# Patient Record
Sex: FEMALE | Race: WHITE | NOT HISPANIC OR LATINO
[De-identification: names, ages, dates, MRNs, and addresses within clinical notes are randomized per-mention and may not be internally consistent; named-entity substitution may affect disease eponyms.]

---

## 2018-10-10 ENCOUNTER — APPOINTMENT (OUTPATIENT)
Dept: OBGYN | Facility: CLINIC | Age: 38
End: 2018-10-10
Payer: MEDICAID

## 2018-10-10 ENCOUNTER — OUTPATIENT (OUTPATIENT)
Dept: OUTPATIENT SERVICES | Facility: HOSPITAL | Age: 38
LOS: 1 days | End: 2018-10-10
Payer: MEDICAID

## 2018-10-10 ENCOUNTER — LABORATORY RESULT (OUTPATIENT)
Age: 38
End: 2018-10-10

## 2018-10-10 VITALS
HEIGHT: 65 IN | SYSTOLIC BLOOD PRESSURE: 110 MMHG | DIASTOLIC BLOOD PRESSURE: 70 MMHG | BODY MASS INDEX: 27.66 KG/M2 | WEIGHT: 166 LBS

## 2018-10-10 DIAGNOSIS — N76.0 ACUTE VAGINITIS: ICD-10-CM

## 2018-10-10 DIAGNOSIS — Z72.0 TOBACCO USE: ICD-10-CM

## 2018-10-10 DIAGNOSIS — Z87.410 PERSONAL HISTORY OF CERVICAL DYSPLASIA: ICD-10-CM

## 2018-10-10 DIAGNOSIS — Z01.419 ENCOUNTER FOR GYNECOLOGICAL EXAMINATION (GENERAL) (ROUTINE) WITHOUT ABNORMAL FINDINGS: ICD-10-CM

## 2018-10-10 DIAGNOSIS — Z80.42 FAMILY HISTORY OF MALIGNANT NEOPLASM OF PROSTATE: ICD-10-CM

## 2018-10-10 DIAGNOSIS — Z80.3 FAMILY HISTORY OF MALIGNANT NEOPLASM OF BREAST: ICD-10-CM

## 2018-10-10 PROCEDURE — 87591 N.GONORRHOEAE DNA AMP PROB: CPT

## 2018-10-10 PROCEDURE — 99203 OFFICE O/P NEW LOW 30 MIN: CPT | Mod: NC

## 2018-10-10 PROCEDURE — 87491 CHLMYD TRACH DNA AMP PROBE: CPT

## 2018-10-10 PROCEDURE — G0463: CPT

## 2018-10-11 LAB
C TRACH RRNA SPEC QL NAA+PROBE: SIGNIFICANT CHANGE UP
N GONORRHOEA RRNA SPEC QL NAA+PROBE: SIGNIFICANT CHANGE UP
SPECIMEN SOURCE: SIGNIFICANT CHANGE UP

## 2018-10-22 DIAGNOSIS — Z80.42 FAMILY HISTORY OF MALIGNANT NEOPLASM OF PROSTATE: ICD-10-CM

## 2018-10-22 DIAGNOSIS — Z87.410 PERSONAL HISTORY OF CERVICAL DYSPLASIA: ICD-10-CM

## 2018-10-22 DIAGNOSIS — Z80.3 FAMILY HISTORY OF MALIGNANT NEOPLASM OF BREAST: ICD-10-CM

## 2018-11-08 ENCOUNTER — OUTPATIENT (OUTPATIENT)
Dept: OUTPATIENT SERVICES | Facility: HOSPITAL | Age: 38
LOS: 1 days | End: 2018-11-08
Payer: MEDICAID

## 2018-11-08 VITALS
HEART RATE: 78 BPM | RESPIRATION RATE: 18 BRPM | TEMPERATURE: 98 F | OXYGEN SATURATION: 99 % | DIASTOLIC BLOOD PRESSURE: 70 MMHG | WEIGHT: 164.02 LBS | HEIGHT: 65 IN | SYSTOLIC BLOOD PRESSURE: 130 MMHG

## 2018-11-08 DIAGNOSIS — Z98.818 OTHER DENTAL PROCEDURE STATUS: Chronic | ICD-10-CM

## 2018-11-08 DIAGNOSIS — Z01.818 ENCOUNTER FOR OTHER PREPROCEDURAL EXAMINATION: ICD-10-CM

## 2018-11-08 DIAGNOSIS — Z30.2 ENCOUNTER FOR STERILIZATION: ICD-10-CM

## 2018-11-08 PROCEDURE — G0463: CPT

## 2018-11-08 PROCEDURE — 85027 COMPLETE CBC AUTOMATED: CPT

## 2018-11-08 RX ORDER — SODIUM CHLORIDE 9 MG/ML
3 INJECTION INTRAMUSCULAR; INTRAVENOUS; SUBCUTANEOUS EVERY 8 HOURS
Qty: 0 | Refills: 0 | Status: DISCONTINUED | OUTPATIENT
Start: 2018-11-20 | End: 2018-12-05

## 2018-11-08 NOTE — H&P PST ADULT - ATTENDING COMMENTS
Agree with above  Pt wishes to have permanent sterilization.    Pt for laparoscopic bilateral tubal sterilization.    Pt understands risks of surgery including but not limited to bleeding, infection, injury to bowel/bladder, nerve damage, blood tx and even maternal death.  Pt has verbalized understanding of the above and has signed informed consent.

## 2018-11-09 LAB
HCT VFR BLD CALC: 41.2 % — SIGNIFICANT CHANGE UP (ref 34.5–45)
HGB BLD-MCNC: 13.6 G/DL — SIGNIFICANT CHANGE UP (ref 11.5–15.5)
MCHC RBC-ENTMCNC: 30.8 PG — SIGNIFICANT CHANGE UP (ref 27–34)
MCHC RBC-ENTMCNC: 33 GM/DL — SIGNIFICANT CHANGE UP (ref 32–36)
MCV RBC AUTO: 93.4 FL — SIGNIFICANT CHANGE UP (ref 80–100)
PLATELET # BLD AUTO: 206 K/UL — SIGNIFICANT CHANGE UP (ref 150–400)
RBC # BLD: 4.41 M/UL — SIGNIFICANT CHANGE UP (ref 3.8–5.2)
RBC # FLD: 13.4 % — SIGNIFICANT CHANGE UP (ref 10.3–14.5)
WBC # BLD: 9.79 K/UL — SIGNIFICANT CHANGE UP (ref 3.8–10.5)
WBC # FLD AUTO: 9.79 K/UL — SIGNIFICANT CHANGE UP (ref 3.8–10.5)

## 2018-11-19 ENCOUNTER — TRANSCRIPTION ENCOUNTER (OUTPATIENT)
Age: 38
End: 2018-11-19

## 2018-11-20 ENCOUNTER — OUTPATIENT (OUTPATIENT)
Dept: OUTPATIENT SERVICES | Facility: HOSPITAL | Age: 38
LOS: 1 days | End: 2018-11-20
Payer: MEDICAID

## 2018-11-20 ENCOUNTER — APPOINTMENT (OUTPATIENT)
Dept: OBGYN | Facility: HOSPITAL | Age: 38
End: 2018-11-20

## 2018-11-20 ENCOUNTER — RESULT REVIEW (OUTPATIENT)
Age: 38
End: 2018-11-20

## 2018-11-20 VITALS
SYSTOLIC BLOOD PRESSURE: 109 MMHG | OXYGEN SATURATION: 100 % | TEMPERATURE: 98 F | HEART RATE: 80 BPM | RESPIRATION RATE: 20 BRPM | DIASTOLIC BLOOD PRESSURE: 69 MMHG

## 2018-11-20 VITALS
RESPIRATION RATE: 16 BRPM | DIASTOLIC BLOOD PRESSURE: 76 MMHG | OXYGEN SATURATION: 100 % | TEMPERATURE: 98 F | HEIGHT: 65 IN | SYSTOLIC BLOOD PRESSURE: 120 MMHG | HEART RATE: 84 BPM | WEIGHT: 164.02 LBS

## 2018-11-20 DIAGNOSIS — Z30.2 ENCOUNTER FOR STERILIZATION: ICD-10-CM

## 2018-11-20 DIAGNOSIS — Z01.818 ENCOUNTER FOR OTHER PREPROCEDURAL EXAMINATION: ICD-10-CM

## 2018-11-20 DIAGNOSIS — Z98.818 OTHER DENTAL PROCEDURE STATUS: Chronic | ICD-10-CM

## 2018-11-20 PROCEDURE — 58661 LAPAROSCOPY REMOVE ADNEXA: CPT | Mod: 50

## 2018-11-20 PROCEDURE — 88302 TISSUE EXAM BY PATHOLOGIST: CPT | Mod: 26

## 2018-11-20 PROCEDURE — 88302 TISSUE EXAM BY PATHOLOGIST: CPT

## 2018-11-20 PROCEDURE — 58661 LAPAROSCOPY REMOVE ADNEXA: CPT

## 2018-11-20 RX ORDER — FAMOTIDINE 10 MG/ML
20 INJECTION INTRAVENOUS ONCE
Qty: 0 | Refills: 0 | Status: COMPLETED | OUTPATIENT
Start: 2018-11-20 | End: 2018-11-20

## 2018-11-20 RX ORDER — ACETAMINOPHEN 500 MG
1000 TABLET ORAL ONCE
Qty: 0 | Refills: 0 | Status: COMPLETED | OUTPATIENT
Start: 2018-11-20 | End: 2018-11-20

## 2018-11-20 RX ORDER — SODIUM CHLORIDE 9 MG/ML
1000 INJECTION, SOLUTION INTRAVENOUS
Qty: 0 | Refills: 0 | Status: DISCONTINUED | OUTPATIENT
Start: 2018-11-20 | End: 2018-12-05

## 2018-11-20 RX ORDER — ACETAMINOPHEN 500 MG
650 TABLET ORAL
Qty: 0 | Refills: 0 | COMMUNITY

## 2018-11-20 RX ORDER — ONDANSETRON 8 MG/1
4 TABLET, FILM COATED ORAL ONCE
Qty: 0 | Refills: 0 | Status: DISCONTINUED | OUTPATIENT
Start: 2018-11-20 | End: 2018-12-05

## 2018-11-20 RX ORDER — OXYCODONE HYDROCHLORIDE 5 MG/1
5 TABLET ORAL ONCE
Qty: 0 | Refills: 0 | Status: DISCONTINUED | OUTPATIENT
Start: 2018-11-20 | End: 2018-11-20

## 2018-11-20 RX ORDER — OXYCODONE HYDROCHLORIDE 5 MG/1
10 TABLET ORAL ONCE
Qty: 0 | Refills: 0 | Status: DISCONTINUED | OUTPATIENT
Start: 2018-11-20 | End: 2018-11-20

## 2018-11-20 RX ORDER — IBUPROFEN 200 MG
3 TABLET ORAL
Qty: 0 | Refills: 0 | COMMUNITY

## 2018-11-20 RX ORDER — CELECOXIB 200 MG/1
200 CAPSULE ORAL ONCE
Qty: 0 | Refills: 0 | Status: DISCONTINUED | OUTPATIENT
Start: 2018-11-20 | End: 2018-12-05

## 2018-11-20 RX ORDER — CELECOXIB 200 MG/1
200 CAPSULE ORAL ONCE
Qty: 0 | Refills: 0 | Status: COMPLETED | OUTPATIENT
Start: 2018-11-20 | End: 2018-11-20

## 2018-11-20 RX ORDER — OXYCODONE HYDROCHLORIDE 5 MG/1
1 TABLET ORAL
Qty: 10 | Refills: 0 | OUTPATIENT
Start: 2018-11-20

## 2018-11-20 RX ADMIN — CELECOXIB 200 MILLIGRAM(S): 200 CAPSULE ORAL at 12:42

## 2018-11-20 RX ADMIN — Medication 1000 MILLIGRAM(S): at 12:42

## 2018-11-20 RX ADMIN — FAMOTIDINE 20 MILLIGRAM(S): 10 INJECTION INTRAVENOUS at 16:03

## 2018-11-20 NOTE — ASU DISCHARGE PLAN (ADULT/PEDIATRIC). - MEDICATION SUMMARY - MEDICATIONS TO TAKE
I will START or STAY ON the medications listed below when I get home from the hospital:    oxyCODONE 5 mg oral tablet  -- 1 tab(s) by mouth every 6 hours, As Needed -for severe pain MDD:4   -- Caution federal law prohibits the transfer of this drug to any person other  than the person for whom it was prescribed.  It is very important that you take or use this exactly as directed.  Do not skip doses or discontinue unless directed by your doctor.  May cause drowsiness.  Alcohol may intensify this effect.  Use care when operating dangerous machinery.  This prescription cannot be refilled.  Using more of this medication than prescribed may cause serious breathing problems.    -- Indication: For pain    ibuprofen 200 mg oral tablet  -- 3 tab(s) by mouth every 6 hours  -- Indication: For pain    Tylenol  -- 650 milligram(s) by mouth every 6 hours  -- Indication: For pain

## 2018-11-20 NOTE — BRIEF OPERATIVE NOTE - PROCEDURE
<<-----Click on this checkbox to enter Procedure Salpingectomy, bilateral, laparoscopic  11/20/2018    Active  [id number]

## 2018-11-20 NOTE — ASU PREOP CHECKLIST - TO WHOM
YARELI Gallegos from YARELI Smith @1821 YARELI Gallegos from YARELI Smith @[facility] / report received from THOMAS Mello RN

## 2018-11-20 NOTE — PRE-ANESTHESIA EVALUATION ADULT - NSANTHOSAYNRD_GEN_A_CORE
No. ISAURA screening performed.  STOP BANG Legend: 0-2 = LOW Risk; 3-4 = INTERMEDIATE Risk; 5-8 = HIGH Risk

## 2018-11-20 NOTE — ASU DISCHARGE PLAN (ADULT/PEDIATRIC). - FOLLOWUP APPOINTMENT CLINIC/PHYSICIAN
Follow up in 2 weeks with the GYN clinic @ [address] [facility]. Call to make an appointment: [phone]

## 2018-11-20 NOTE — ASU DISCHARGE PLAN (ADULT/PEDIATRIC). - ACTIVITY LEVEL
nothing per rectum/no douching/nothing per vagina/no tub baths/until cleared by MD/no tampons/no intercourse

## 2018-11-29 PROBLEM — F17.200 NICOTINE DEPENDENCE, UNSPECIFIED, UNCOMPLICATED: Chronic | Status: ACTIVE | Noted: 2018-11-08

## 2018-12-10 ENCOUNTER — APPOINTMENT (OUTPATIENT)
Dept: OBGYN | Facility: CLINIC | Age: 38
End: 2018-12-10
Payer: MEDICAID

## 2018-12-10 ENCOUNTER — OUTPATIENT (OUTPATIENT)
Dept: OUTPATIENT SERVICES | Facility: HOSPITAL | Age: 38
LOS: 1 days | End: 2018-12-10
Payer: MEDICAID

## 2018-12-10 VITALS — DIASTOLIC BLOOD PRESSURE: 72 MMHG | WEIGHT: 165 LBS | BODY MASS INDEX: 27.46 KG/M2 | SYSTOLIC BLOOD PRESSURE: 112 MMHG

## 2018-12-10 DIAGNOSIS — N76.0 ACUTE VAGINITIS: ICD-10-CM

## 2018-12-10 DIAGNOSIS — Z98.818 OTHER DENTAL PROCEDURE STATUS: Chronic | ICD-10-CM

## 2018-12-10 PROCEDURE — G0463: CPT

## 2018-12-10 PROCEDURE — 99213 OFFICE O/P EST LOW 20 MIN: CPT | Mod: GE

## 2018-12-12 DIAGNOSIS — Z90.79 ACQUIRED ABSENCE OF OTHER GENITAL ORGAN(S): ICD-10-CM

## 2018-12-22 ENCOUNTER — TRANSCRIPTION ENCOUNTER (OUTPATIENT)
Age: 38
End: 2018-12-22

## 2019-11-09 ENCOUNTER — TRANSCRIPTION ENCOUNTER (OUTPATIENT)
Age: 39
End: 2019-11-09

## 2020-12-04 ENCOUNTER — TRANSCRIPTION ENCOUNTER (OUTPATIENT)
Age: 40
End: 2020-12-04

## 2021-01-09 ENCOUNTER — FORM ENCOUNTER (OUTPATIENT)
Age: 41
End: 2021-01-09

## 2021-01-11 ENCOUNTER — APPOINTMENT (OUTPATIENT)
Dept: INTERNAL MEDICINE | Facility: CLINIC | Age: 41
End: 2021-01-11

## 2021-01-11 DIAGNOSIS — Z78.9 OTHER SPECIFIED HEALTH STATUS: ICD-10-CM

## 2021-01-11 DIAGNOSIS — U07.1 COVID-19: ICD-10-CM

## 2021-01-11 RX ORDER — BENZONATATE 100 MG/1
100 CAPSULE ORAL EVERY 8 HOURS
Qty: 30 | Refills: 0 | Status: ACTIVE | COMMUNITY
Start: 2021-01-11 | End: 1900-01-01

## 2021-01-12 PROBLEM — Z78.9 SOCIAL ALCOHOL USE: Status: ACTIVE | Noted: 2021-01-12

## 2021-01-12 PROBLEM — U07.1 COVID-19: Status: ACTIVE | Noted: 2021-01-11

## 2021-01-13 NOTE — ASSESSMENT
[FreeTextEntry1] : Ms. CANALES is a 40 year F with no significant PMH here to establish care\par \par #HCM\par - Will need to be addressed next visit after 14 days of quarantining\par \par \par RTC in 1-2 months for in person visit for physical\par \par \par Case d/w Dr. Cameron\par \par \par \par Chasidy Basurto MD\par Internal Medicine PGY-3

## 2021-01-13 NOTE — HISTORY OF PRESENT ILLNESS
[Home] : at home, [unfilled] , at the time of the visit. [Other Location: e.g. Home (Enter Location, City,State)___] : at [unfilled] [Verbal consent obtained from patient] : the patient, [unfilled] [FreeTextEntry1] : Ms. CANALES is a 40 year F with no significant PMH here to establish care [de-identified] : Patient reports she wants to be treated for COVID19. She tested positive on 1/6 and since then has been experiencing worsening cough. In addition has loss of smell and loss of taste. The day before she was able to taste her chinese food however the following day she was unable to taste food that’s when she decided to be tested. In addition, she believes he contracted the virus from her  who is currently at [facility] on ECMO, not doing well. Her brother-in-law received the monoclonal antibody infusion and was wondering if she could get it to feel better. Her 16-year old step-son also tested positive. She is now quarantining at home and wearing a mask. Currently she has no shortness of breath, abdominal pain, nausea, vomiting, fevers, chills or diarrhea. She has a pulse oximeter which typically reads 98-99%. For her cough has been trying Mucinex with minimal relief. Had an extensive conversation regarding treatment/management for COVID19 inpatient vs. outpatient and how given how young and healthy she is she does not currently meet criteria for monoclonal antibody infusion. Rather for her the focus will be on symptoms for comfort.

## 2021-01-13 NOTE — REVIEW OF SYSTEMS
[Cough] : cough [Fever] : no fever [Chills] : no chills [Nasal Discharge] : no nasal discharge [Chest Pain] : no chest pain [Lower Ext Edema] : no lower extremity edema [Shortness Of Breath] : no shortness of breath [Wheezing] : no wheezing [Dyspnea on Exertion] : no dyspnea on exertion [Abdominal Pain] : no abdominal pain [Nausea] : no nausea [Diarrhea] : diarrhea [Vomiting] : no vomiting [Dysuria] : no dysuria

## 2021-01-16 ENCOUNTER — NON-APPOINTMENT (OUTPATIENT)
Age: 41
End: 2021-01-16

## 2021-01-19 ENCOUNTER — NON-APPOINTMENT (OUTPATIENT)
Age: 41
End: 2021-01-19

## 2021-07-30 ENCOUNTER — NON-APPOINTMENT (OUTPATIENT)
Age: 41
End: 2021-07-30

## 2021-07-30 LAB
COVID-19 SPIKE DOMAIN ANTIBODY INTERPRETATION: POSITIVE
SARS-COV-2 AB SERPL IA-ACNC: 132 U/ML

## 2021-08-30 ENCOUNTER — APPOINTMENT (OUTPATIENT)
Dept: INTERNAL MEDICINE | Facility: CLINIC | Age: 41
End: 2021-08-30
Payer: MEDICAID

## 2021-08-30 ENCOUNTER — OUTPATIENT (OUTPATIENT)
Dept: OUTPATIENT SERVICES | Facility: HOSPITAL | Age: 41
LOS: 1 days | End: 2021-08-30
Payer: MEDICAID

## 2021-08-30 VITALS
HEART RATE: 88 BPM | DIASTOLIC BLOOD PRESSURE: 80 MMHG | HEIGHT: 65 IN | WEIGHT: 172 LBS | OXYGEN SATURATION: 99 % | BODY MASS INDEX: 28.66 KG/M2 | SYSTOLIC BLOOD PRESSURE: 120 MMHG

## 2021-08-30 DIAGNOSIS — Z98.818 OTHER DENTAL PROCEDURE STATUS: Chronic | ICD-10-CM

## 2021-08-30 DIAGNOSIS — F17.200 NICOTINE DEPENDENCE, UNSPECIFIED, UNCOMPLICATED: ICD-10-CM

## 2021-08-30 DIAGNOSIS — Z00.00 ENCOUNTER FOR GENERAL ADULT MEDICAL EXAMINATION WITHOUT ABNORMAL FINDINGS: ICD-10-CM

## 2021-08-30 DIAGNOSIS — R00.2 PALPITATIONS: ICD-10-CM

## 2021-08-30 DIAGNOSIS — Z23 ENCOUNTER FOR IMMUNIZATION: ICD-10-CM

## 2021-08-30 DIAGNOSIS — I10 ESSENTIAL (PRIMARY) HYPERTENSION: ICD-10-CM

## 2021-08-30 DIAGNOSIS — Z72.89 OTHER PROBLEMS RELATED TO LIFESTYLE: ICD-10-CM

## 2021-08-30 PROCEDURE — 90471 IMMUNIZATION ADMIN: CPT

## 2021-08-30 PROCEDURE — 90715 TDAP VACCINE 7 YRS/> IM: CPT

## 2021-08-30 PROCEDURE — 99396 PREV VISIT EST AGE 40-64: CPT | Mod: GC

## 2021-08-30 PROCEDURE — G0463: CPT | Mod: 25

## 2021-09-18 NOTE — HISTORY OF PRESENT ILLNESS
[FreeTextEntry1] : CPE [de-identified] : Patient is 40yoF w/o major medical issues, s/p COVID w/o hospitalization in January 2021 here for CPE. Recent labs done 8/13, unremarkable. Last PAP in 2017, patient-reported as normal. Currently on menstrual period. Patient's complains of left shoulder/arm pain, shooting/stabbing, from axilla to elbow on left. Also has concerns for occasional palpitations w/o chest pain, dyspnea, diaphoresis, or syncope, sometimes random, sometime stress-associated, always self-resolving. Does not want to pursue COVID vaccine at this time, but does request TDaP. Is current everyday smoker, 6 cigarettes a day, and daily drinker 2-3 glasses of wine nightly. Does not see either as an issue in her life. Denies depressive symptoms, SI.

## 2021-09-18 NOTE — REVIEW OF SYSTEMS
[Palpitations] : palpitations [Anxiety] : anxiety [Fever] : no fever [Chills] : no chills [Discharge] : no discharge [Vision Problems] : no vision problems [Earache] : no earache [Hearing Loss] : no hearing loss [Sore Throat] : no sore throat [Chest Pain] : no chest pain [Lower Ext Edema] : no lower extremity edema [Orthopnea] : no orthopnea [Shortness Of Breath] : no shortness of breath [Cough] : no cough [Dyspnea on Exertion] : no dyspnea on exertion [Abdominal Pain] : no abdominal pain [Nausea] : no nausea [Vomiting] : no vomiting [Dysuria] : no dysuria [Frequency] : no frequency [Joint Pain] : no joint pain [Muscle Pain] : no muscle pain [Back Pain] : no back pain [Itching] : no itching [Headache] : no headache [Dizziness] : no dizziness [Fainting] : no fainting [Suicidal] : not suicidal [Depression] : no depression [Swollen Glands] : no swollen glands [FreeTextEntry9] : + generalized L arm pain

## 2021-09-18 NOTE — HEALTH RISK ASSESSMENT
[] : Yes [10-14] : 10-14 [Yes] : Yes [4 or more  times a week (4 pts)] : 4 or more  times a week (4 points) [3 or 4 (1 pt)] : 3 or 4  (1 point) [Never (0 pts)] : Never (0 points) [0] : 2) Feeling down, depressed, or hopeless: Not at all (0) [PHQ-2 Negative - No further assessment needed] : PHQ-2 Negative - No further assessment needed [Audit-CScore] : 5 [[id number]] : 0

## 2021-09-18 NOTE — PHYSICAL EXAM
[No Acute Distress] : no acute distress [Well Nourished] : well nourished [Well Developed] : well developed [Well-Appearing] : well-appearing [Normal Sclera/Conjunctiva] : normal sclera/conjunctiva [PERRL] : pupils equal round and reactive to light [EOMI] : extraocular movements intact [Normal Outer Ear/Nose] : the outer ears and nose were normal in appearance [Normal Oropharynx] : the oropharynx was normal [No Lymphadenopathy] : no lymphadenopathy [No Respiratory Distress] : no respiratory distress  [No Accessory Muscle Use] : no accessory muscle use [Clear to Auscultation] : lungs were clear to auscultation bilaterally [Normal Rate] : normal rate  [Regular Rhythm] : with a regular rhythm [No Murmur] : no murmur heard [Pedal Pulses Present] : the pedal pulses are present [No Edema] : there was no peripheral edema [Soft] : abdomen soft [Non Tender] : non-tender [Normal Bowel Sounds] : normal bowel sounds [Normal Posterior Cervical Nodes] : no posterior cervical lymphadenopathy [Normal Anterior Cervical Nodes] : no anterior cervical lymphadenopathy [No Spinal Tenderness] : no spinal tenderness [No Joint Swelling] : no joint swelling [Grossly Normal Strength/Tone] : grossly normal strength/tone [No Rash] : no rash [No Focal Deficits] : no focal deficits [Normal Gait] : normal gait [Normal Affect] : the affect was normal [Alert and Oriented x3] : oriented to person, place, and time [Normal Insight/Judgement] : insight and judgment were intact [de-identified] : Pain in left arm elicited w/ abduction of shoulder

## 2021-09-18 NOTE — ASSESSMENT
[FreeTextEntry1] : Patient is a 40yoF current everyday smoker and alcohol use disorder here for CPE, also with concerns for palpitations and L arm pain, likely benign/MSK in nature, respectively.\par \par #L arm pain\par -reproduced on L shoulder abduction; suspect nerve impingement\par -no crepitus noted in shoulder, normal strength and ROM\par -cont to monitor\par \par #Palpitations\par -cardiac exam normal, no aberrancy in pulse noted. In setting of otherwise normal exam and lack of red-flag symptoms, likely benign periods of ectopy, self-resolving, linked to stressors\par -reassurance given; patient educated on red-flags for MI/angina\par -advised to stop smoking/ heavy EtOH use \par \par #Smoker\par -currently 6 cigarettes daily\par -advised quitting for lung and cardiovascular health; patient appears unwilling to quit at this time\par \par #EtOH use disorder\par -current drinking habits suggestive of mild abuse; patient admits to using alcohol as treatment for stress, declines psychiatric meds/interventions at this time, and thinks she'll be able to quit nightly drinking now that she has a new job\par -will re-assess drinking at next visit\par \par #HCM\par -labs UTD, reviewed\par -GYN referral for PAP given \par -TDaP administered\par \par RTC in 1 year for CPE

## 2021-09-21 ENCOUNTER — TRANSCRIPTION ENCOUNTER (OUTPATIENT)
Age: 41
End: 2021-09-21

## 2021-10-03 ENCOUNTER — TRANSCRIPTION ENCOUNTER (OUTPATIENT)
Age: 41
End: 2021-10-03

## 2021-10-05 ENCOUNTER — TRANSCRIPTION ENCOUNTER (OUTPATIENT)
Age: 41
End: 2021-10-05

## 2021-10-13 ENCOUNTER — TRANSCRIPTION ENCOUNTER (OUTPATIENT)
Age: 41
End: 2021-10-13

## 2021-10-14 ENCOUNTER — NON-APPOINTMENT (OUTPATIENT)
Age: 41
End: 2021-10-14

## 2021-10-18 ENCOUNTER — TRANSCRIPTION ENCOUNTER (OUTPATIENT)
Age: 41
End: 2021-10-18

## 2021-10-18 ENCOUNTER — NON-APPOINTMENT (OUTPATIENT)
Age: 41
End: 2021-10-18

## 2021-11-08 ENCOUNTER — TRANSCRIPTION ENCOUNTER (OUTPATIENT)
Age: 41
End: 2021-11-08

## 2021-11-17 ENCOUNTER — TRANSCRIPTION ENCOUNTER (OUTPATIENT)
Age: 41
End: 2021-11-17

## 2021-11-26 ENCOUNTER — TRANSCRIPTION ENCOUNTER (OUTPATIENT)
Age: 41
End: 2021-11-26

## 2022-01-26 ENCOUNTER — APPOINTMENT (OUTPATIENT)
Dept: INTERNAL MEDICINE | Facility: CLINIC | Age: 42
End: 2022-01-26

## 2022-01-26 ENCOUNTER — OUTPATIENT (OUTPATIENT)
Dept: OUTPATIENT SERVICES | Facility: HOSPITAL | Age: 42
LOS: 1 days | End: 2022-01-26
Payer: MEDICAID

## 2022-01-26 DIAGNOSIS — Z98.818 OTHER DENTAL PROCEDURE STATUS: Chronic | ICD-10-CM

## 2022-01-26 DIAGNOSIS — Z00.00 ENCOUNTER FOR GENERAL ADULT MEDICAL EXAMINATION W/OUT ABNORMAL FINDINGS: ICD-10-CM

## 2022-01-26 DIAGNOSIS — I10 ESSENTIAL (PRIMARY) HYPERTENSION: ICD-10-CM

## 2022-01-26 DIAGNOSIS — R39.9 UNSPECIFIED SYMPTOMS AND SIGNS INVOLVING THE GENITOURINARY SYSTEM: ICD-10-CM

## 2022-01-26 PROCEDURE — G0463: CPT

## 2022-02-03 PROBLEM — Z00.00 WELL ADULT EXAM: Status: ACTIVE | Noted: 2022-02-03

## 2022-02-07 PROBLEM — R39.9 SYMPTOMS INVOLVING URINARY SYSTEM: Status: ACTIVE | Noted: 2022-02-07

## 2022-02-07 NOTE — REVIEW OF SYSTEMS
[Negative] : Musculoskeletal [Dysuria] : no dysuria [Incontinence] : no incontinence [Hematuria] : no hematuria [Frequency] : no frequency [Dizziness] : no dizziness [Fainting] : no fainting [FreeTextEntry8] : change in urine odor

## 2022-02-07 NOTE — ASSESSMENT
[FreeTextEntry1] : 42 y/o F w/ no sig PMH p/w requesting labwork as she is a vegatarian \par \par #HCM\par -Patient vegetarian, but asx and no signs of nutritional deficiency currently \par -CBC, CMP, Lipid Profile, Folate, Vitamin B12, UA\par -Will discuss vaccines and pap smear at next in-person visit \par \par RTC for CPE

## 2022-02-07 NOTE — PHYSICAL EXAM
[No Acute Distress] : no acute distress [Normal Voice/Communication] : normal voice/communication [Speech Grossly Normal] : speech grossly normal [Memory Grossly Normal] : memory grossly normal [Alert and Oriented x3] : oriented to person, place, and time [Normal Mood] : the mood was normal [Normal Insight/Judgement] : insight and judgment were intact [de-identified] : Limited as this was a telephonic visit

## 2022-02-07 NOTE — HISTORY OF PRESENT ILLNESS
[Home] : at home, [unfilled] , at the time of the visit. [Medical Office: ([facility])___] : at the medical office located in  [Verbal consent obtained from patient] : the patient, [unfilled] [de-identified] : 42 y/o F w/ no sig PMH p/w requesting labwork as she is a vegatarian \par \par Patient denies fever, chills, cough, SOB, CP, tingling/numbness in hands and feet \par \par Patient says she has had malodourous urine for the past 2 weeks. She denies dysuria, flank pain, change in urinary frequency \par \par She feels well and has no acute complaints

## 2022-02-09 DIAGNOSIS — R39.9 UNSPECIFIED SYMPTOMS AND SIGNS INVOLVING THE GENITOURINARY SYSTEM: ICD-10-CM

## 2022-02-11 ENCOUNTER — NON-APPOINTMENT (OUTPATIENT)
Age: 42
End: 2022-02-11

## 2022-02-11 LAB
ALBUMIN SERPL ELPH-MCNC: 4.9 G/DL
ALP BLD-CCNC: 59 U/L
ALT SERPL-CCNC: 17 U/L
ANION GAP SERPL CALC-SCNC: 12 MMOL/L
APPEARANCE: CLEAR
AST SERPL-CCNC: 19 U/L
BACTERIA: NEGATIVE
BASOPHILS # BLD AUTO: 0.04 K/UL
BASOPHILS NFR BLD AUTO: 0.5 %
BILIRUB SERPL-MCNC: 0.6 MG/DL
BILIRUBIN URINE: NEGATIVE
BLOOD URINE: ABNORMAL
BUN SERPL-MCNC: 14 MG/DL
CALCIUM SERPL-MCNC: 10.4 MG/DL
CHLORIDE SERPL-SCNC: 102 MMOL/L
CHOLEST SERPL-MCNC: 192 MG/DL
CO2 SERPL-SCNC: 27 MMOL/L
COLOR: NORMAL
CREAT SERPL-MCNC: 0.68 MG/DL
EOSINOPHIL # BLD AUTO: 0.19 K/UL
EOSINOPHIL NFR BLD AUTO: 2.5 %
GLUCOSE QUALITATIVE U: NEGATIVE
GLUCOSE SERPL-MCNC: 83 MG/DL
HCT VFR BLD CALC: 43.4 %
HDLC SERPL-MCNC: 69 MG/DL
HGB BLD-MCNC: 13.9 G/DL
HYALINE CASTS: 1 /LPF
IMM GRANULOCYTES NFR BLD AUTO: 0.1 %
KETONES URINE: NEGATIVE
LDLC SERPL CALC-MCNC: 101 MG/DL
LEUKOCYTE ESTERASE URINE: NEGATIVE
LYMPHOCYTES # BLD AUTO: 1.91 K/UL
LYMPHOCYTES NFR BLD AUTO: 25.5 %
MAN DIFF?: NORMAL
MCHC RBC-ENTMCNC: 29.6 PG
MCHC RBC-ENTMCNC: 32 GM/DL
MCV RBC AUTO: 92.3 FL
MICROSCOPIC-UA: NORMAL
MONOCYTES # BLD AUTO: 0.47 K/UL
MONOCYTES NFR BLD AUTO: 6.3 %
NEUTROPHILS # BLD AUTO: 4.87 K/UL
NEUTROPHILS NFR BLD AUTO: 65.1 %
NITRITE URINE: NEGATIVE
NONHDLC SERPL-MCNC: 123 MG/DL
PH URINE: 7.5
PLATELET # BLD AUTO: 236 K/UL
POTASSIUM SERPL-SCNC: 4.9 MMOL/L
PROT SERPL-MCNC: 7.6 G/DL
PROTEIN URINE: NEGATIVE
RBC # BLD: 4.7 M/UL
RBC # FLD: 14.4 %
RED BLOOD CELLS URINE: 27 /HPF
SODIUM SERPL-SCNC: 141 MMOL/L
SPECIFIC GRAVITY URINE: 1.01
SQUAMOUS EPITHELIAL CELLS: 7 /HPF
TRIGL SERPL-MCNC: 113 MG/DL
UROBILINOGEN URINE: NORMAL
WBC # FLD AUTO: 7.49 K/UL
WHITE BLOOD CELLS URINE: 4 /HPF

## 2024-02-06 ENCOUNTER — RESULT REVIEW (OUTPATIENT)
Age: 44
End: 2024-02-06

## 2024-02-06 ENCOUNTER — APPOINTMENT (OUTPATIENT)
Dept: OBGYN | Facility: CLINIC | Age: 44
End: 2024-02-06
Payer: COMMERCIAL

## 2024-02-06 VITALS
DIASTOLIC BLOOD PRESSURE: 74 MMHG | WEIGHT: 149 LBS | BODY MASS INDEX: 24.83 KG/M2 | HEIGHT: 65 IN | SYSTOLIC BLOOD PRESSURE: 134 MMHG

## 2024-02-06 DIAGNOSIS — N64.4 MASTODYNIA: ICD-10-CM

## 2024-02-06 DIAGNOSIS — Z01.419 ENCOUNTER FOR GYNECOLOGICAL EXAMINATION (GENERAL) (ROUTINE) W/OUT ABNORMAL FINDINGS: ICD-10-CM

## 2024-02-06 DIAGNOSIS — Z01.812 ENCOUNTER FOR PREPROCEDURAL LABORATORY EXAMINATION: ICD-10-CM

## 2024-02-06 DIAGNOSIS — Z11.52 ENCOUNTER FOR PREPROCEDURAL LABORATORY EXAMINATION: ICD-10-CM

## 2024-02-06 PROCEDURE — 99386 PREV VISIT NEW AGE 40-64: CPT

## 2024-02-06 NOTE — HISTORY OF PRESENT ILLNESS
[FreeTextEntry1] : Patient complaining of left breast pain Drinks a lot of caffeine and recently change to a more physical job  She has never had a mammogram  [Gonorrhea test declined] : Gonorrhea test declined [Chlamydia test declined] : Chlamydia test declined [Y] : Patient uses contraception [Tubal Occlusion] : has had a tubal occlusion [TextBox_19] : never  [PapSmeardate] : 2018

## 2024-02-08 LAB — HPV HIGH+LOW RISK DNA PNL CVX: NOT DETECTED

## 2024-02-12 LAB — CYTOLOGY CVX/VAG DOC THIN PREP: ABNORMAL

## 2024-03-06 ENCOUNTER — APPOINTMENT (OUTPATIENT)
Dept: ULTRASOUND IMAGING | Facility: IMAGING CENTER | Age: 44
End: 2024-03-06
Payer: COMMERCIAL

## 2024-03-06 ENCOUNTER — APPOINTMENT (OUTPATIENT)
Dept: MAMMOGRAPHY | Facility: IMAGING CENTER | Age: 44
End: 2024-03-06
Payer: COMMERCIAL

## 2024-03-06 ENCOUNTER — TRANSCRIPTION ENCOUNTER (OUTPATIENT)
Age: 44
End: 2024-03-06

## 2024-03-06 ENCOUNTER — OUTPATIENT (OUTPATIENT)
Dept: OUTPATIENT SERVICES | Facility: HOSPITAL | Age: 44
LOS: 1 days | End: 2024-03-06
Payer: COMMERCIAL

## 2024-03-06 ENCOUNTER — RESULT REVIEW (OUTPATIENT)
Age: 44
End: 2024-03-06

## 2024-03-06 DIAGNOSIS — Z98.818 OTHER DENTAL PROCEDURE STATUS: Chronic | ICD-10-CM

## 2024-03-06 DIAGNOSIS — Z00.8 ENCOUNTER FOR OTHER GENERAL EXAMINATION: ICD-10-CM

## 2024-03-06 PROCEDURE — 76641 ULTRASOUND BREAST COMPLETE: CPT | Mod: 26,LT

## 2024-03-06 PROCEDURE — 77066 DX MAMMO INCL CAD BI: CPT

## 2024-03-06 PROCEDURE — G0279: CPT | Mod: 26

## 2024-03-06 PROCEDURE — G0279: CPT

## 2024-03-06 PROCEDURE — 77066 DX MAMMO INCL CAD BI: CPT | Mod: 26

## 2024-03-06 PROCEDURE — 76641 ULTRASOUND BREAST COMPLETE: CPT

## 2024-10-30 ENCOUNTER — NON-APPOINTMENT (OUTPATIENT)
Age: 44
End: 2024-10-30

## 2025-02-26 NOTE — ASU DISCHARGE PLAN (ADULT/PEDIATRIC). - NOTIFY
Bedside and Verbal shift change report given to Chuck RN (oncoming nurse) by Sara RN (offgoing nurse). Report included the following information Nurse Handoff Report, ED Encounter Summary, ED SBAR, and Recent Results.     Unable to Urinate/Swelling that continues/Persistent Nausea and Vomiting/Bleeding that does not stop/GYN Fever>100.4/Excessive Diarrhea/Increased Irritability or Sluggishness/Numbness, tingling/Numbness, color, or temperature change to extremity/Pain not relieved by Medications/Inability to Tolerate Liquids or Foods